# Patient Record
Sex: MALE | URBAN - METROPOLITAN AREA
[De-identification: names, ages, dates, MRNs, and addresses within clinical notes are randomized per-mention and may not be internally consistent; named-entity substitution may affect disease eponyms.]

---
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
## 2025-05-08 NOTE — HPI: RASH
Care Transition Week 2/Final Telephone Call Attempt    Discharge Date: 3/24/25  Discharge Location: [facility]: [facility]    Call Attempt Date: 5/8/2025  Call Attempt: Final--LVM    CT program completed and will be closed at this time    
What Type Of Note Output Would You Prefer (Optional)?: Bullet Format
How Severe Is Your Rash?: mild
Is This A New Presentation, Or A Follow-Up?: Rash